# Patient Record
Sex: FEMALE | Race: WHITE | NOT HISPANIC OR LATINO | Employment: OTHER | URBAN - METROPOLITAN AREA
[De-identification: names, ages, dates, MRNs, and addresses within clinical notes are randomized per-mention and may not be internally consistent; named-entity substitution may affect disease eponyms.]

---

## 2018-02-27 ENCOUNTER — CATARACT CONSULT (OUTPATIENT)
Dept: URBAN - METROPOLITAN AREA CLINIC 43 | Facility: CLINIC | Age: 68
End: 2018-02-27

## 2018-02-27 VITALS
HEART RATE: 68 BPM | SYSTOLIC BLOOD PRESSURE: 138 MMHG | DIASTOLIC BLOOD PRESSURE: 80 MMHG | RESPIRATION RATE: 14 BRPM | HEIGHT: 55 IN

## 2018-02-27 DIAGNOSIS — H04.123: ICD-10-CM

## 2018-02-27 DIAGNOSIS — H25.811: ICD-10-CM

## 2018-02-27 DIAGNOSIS — H25.812: ICD-10-CM

## 2018-02-27 DIAGNOSIS — H43.813: ICD-10-CM

## 2018-02-27 DIAGNOSIS — H18.51: ICD-10-CM

## 2018-02-27 PROCEDURE — 4040F PNEUMOC VAC/ADMIN/RCVD: CPT

## 2018-02-27 PROCEDURE — 92136TC INTERFEROMETRY - TECHNICAL COMPONENT

## 2018-02-27 PROCEDURE — 92025-1 CORNEAL TOPOGRAPHY, INS

## 2018-02-27 PROCEDURE — 1036F TOBACCO NON-USER: CPT

## 2018-02-27 PROCEDURE — 99214 OFFICE O/P EST MOD 30 MIN: CPT

## 2018-02-27 PROCEDURE — 92015 DETERMINE REFRACTIVE STATE: CPT

## 2018-02-27 PROCEDURE — G8427 DOCREV CUR MEDS BY ELIG CLIN: HCPCS

## 2018-02-27 PROCEDURE — G8752 SYS BP LESS 140: HCPCS

## 2018-02-27 PROCEDURE — 92286 ANT SGM IMG I&R SPECLR MIC: CPT

## 2018-02-27 PROCEDURE — G8754 DIAS BP LESS 90: HCPCS

## 2018-02-27 RX ORDER — DUREZOL 0.5 MG/ML: 1 EMULSION OPHTHALMIC TWICE A DAY

## 2018-02-27 RX ORDER — MOXIFLOXACIN HYDROCHLORIDE 5 MG/ML: 1 SOLUTION/ DROPS OPHTHALMIC

## 2018-02-27 RX ORDER — NEPAFENAC 3 MG/ML: 1 SUSPENSION/ DROPS OPHTHALMIC ONCE A DAY

## 2018-02-27 ASSESSMENT — VISUAL ACUITY
OS_SC: 20/30-1
OD_CC: J3
OS_CC: 20/50
OD_SC: 20/30-1
OS_CC: J4
OD_BAT: <20/400
OS_SC: J12
OD_CC: 20/25-2
OS_BAT: <20/400
OD_PAM: 20/20
OD_SC: J12

## 2018-02-27 ASSESSMENT — TONOMETRY
OD_IOP_MMHG: 15
OS_IOP_MMHG: 14

## 2018-03-26 ENCOUNTER — SURGERY/PROCEDURE (OUTPATIENT)
Dept: URBAN - METROPOLITAN AREA CLINIC 43 | Facility: CLINIC | Age: 68
End: 2018-03-26

## 2018-03-26 DIAGNOSIS — H25.812: ICD-10-CM

## 2018-03-26 PROCEDURE — 66984 XCAPSL CTRC RMVL W/O ECP: CPT

## 2018-03-27 ENCOUNTER — POST OP/EVAL OF SECOND EYE (OUTPATIENT)
Dept: URBAN - METROPOLITAN AREA CLINIC 43 | Facility: CLINIC | Age: 68
End: 2018-03-27

## 2018-03-27 DIAGNOSIS — H25.811: ICD-10-CM

## 2018-03-27 PROCEDURE — G8756 NO BP MEASURE DOC: HCPCS

## 2018-03-27 PROCEDURE — 92012 INTRM OPH EXAM EST PATIENT: CPT

## 2018-03-27 PROCEDURE — G8427 DOCREV CUR MEDS BY ELIG CLIN: HCPCS

## 2018-03-27 PROCEDURE — 1036F TOBACCO NON-USER: CPT

## 2018-03-27 ASSESSMENT — TONOMETRY
OS_IOP_MMHG: 10
OD_IOP_MMHG: 12

## 2018-03-27 ASSESSMENT — VISUAL ACUITY
OS_SC: 20/30+2
OD_SC: J8
OD_BAT: >20/400
OS_SC: J6
OD_SC: 20/30

## 2018-04-02 ENCOUNTER — 1 WEEK POST-OP (OUTPATIENT)
Dept: URBAN - METROPOLITAN AREA CLINIC 39 | Facility: CLINIC | Age: 68
End: 2018-04-02

## 2018-04-02 ENCOUNTER — SURGERY/PROCEDURE (OUTPATIENT)
Dept: URBAN - METROPOLITAN AREA CLINIC 43 | Facility: CLINIC | Age: 68
End: 2018-04-02

## 2018-04-02 DIAGNOSIS — H25.811: ICD-10-CM

## 2018-04-02 DIAGNOSIS — Z96.1: ICD-10-CM

## 2018-04-02 PROCEDURE — G8756 NO BP MEASURE DOC: HCPCS

## 2018-04-02 PROCEDURE — G9744 PT NOT ELI D/T ACT DIG HTN: HCPCS

## 2018-04-02 PROCEDURE — 4040F PNEUMOC VAC/ADMIN/RCVD: CPT

## 2018-04-02 PROCEDURE — 1036F TOBACCO NON-USER: CPT

## 2018-04-02 PROCEDURE — 66984 XCAPSL CTRC RMVL W/O ECP: CPT

## 2018-04-02 PROCEDURE — 99211T TECH SERVICE

## 2018-04-02 PROCEDURE — G8428 CUR MEDS NOT DOCUMENT: HCPCS

## 2018-04-02 ASSESSMENT — VISUAL ACUITY
OS_SC: J6
OD_SC: 20/30
OS_SC: 20/25+2
OD_BAT: <20/400
OD_SC: J8

## 2018-04-02 ASSESSMENT — TONOMETRY
OS_IOP_MMHG: 14
OD_IOP_MMHG: 14

## 2018-04-03 ENCOUNTER — CATARACT POST-OP 1-DAY (OUTPATIENT)
Dept: URBAN - METROPOLITAN AREA CLINIC 43 | Facility: CLINIC | Age: 68
End: 2018-04-03

## 2018-04-03 DIAGNOSIS — Z96.1: ICD-10-CM

## 2018-04-03 PROCEDURE — 99024 POSTOP FOLLOW-UP VISIT: CPT

## 2018-04-03 ASSESSMENT — VISUAL ACUITY
OS_SC: 20/30
OD_SC: 20/40+1

## 2018-04-03 ASSESSMENT — TONOMETRY
OD_IOP_MMHG: 13
OS_IOP_MMHG: 13

## 2018-04-16 ENCOUNTER — REFRACTION ONLY (OUTPATIENT)
Dept: URBAN - METROPOLITAN AREA CLINIC 43 | Facility: CLINIC | Age: 68
End: 2018-04-16

## 2018-04-16 DIAGNOSIS — Z96.1: ICD-10-CM

## 2018-04-16 PROCEDURE — 99024 POSTOP FOLLOW-UP VISIT: CPT

## 2018-04-16 ASSESSMENT — VISUAL ACUITY
OD_SC: 20/30-1
OS_SC: 20/25+1

## 2018-04-16 ASSESSMENT — TONOMETRY
OD_IOP_MMHG: 21
OS_IOP_MMHG: 11

## 2021-11-18 NOTE — PATIENT DISCUSSION
Patient given Rx for glasses - will sched I&R training for CL - then will possibly do another fitting and reorder.

## 2022-03-22 ENCOUNTER — NEW PATIENT (OUTPATIENT)
Dept: URBAN - METROPOLITAN AREA CLINIC 43 | Facility: CLINIC | Age: 72
End: 2022-03-22

## 2022-03-22 DIAGNOSIS — H18.513: ICD-10-CM

## 2022-03-22 DIAGNOSIS — H04.123: ICD-10-CM

## 2022-03-22 DIAGNOSIS — H26.493: ICD-10-CM

## 2022-03-22 DIAGNOSIS — Z96.1: ICD-10-CM

## 2022-03-22 DIAGNOSIS — H43.813: ICD-10-CM

## 2022-03-22 PROCEDURE — 92004 COMPRE OPH EXAM NEW PT 1/>: CPT

## 2022-03-22 PROCEDURE — 92015 DETERMINE REFRACTIVE STATE: CPT

## 2022-03-22 ASSESSMENT — VISUAL ACUITY
OS_SC: 20/25+2
OD_SC: 20/25
OD_SC: J12
OD_CC: J1
OS_CC: J1
OS_SC: J8-

## 2022-03-22 ASSESSMENT — TONOMETRY
OD_IOP_MMHG: 14
OS_IOP_MMHG: 12

## 2023-04-10 ENCOUNTER — COMPREHENSIVE EXAM (OUTPATIENT)
Dept: URBAN - METROPOLITAN AREA CLINIC 43 | Facility: CLINIC | Age: 73
End: 2023-04-10

## 2023-04-10 DIAGNOSIS — H04.123: ICD-10-CM

## 2023-04-10 DIAGNOSIS — H43.813: ICD-10-CM

## 2023-04-10 DIAGNOSIS — H18.513: ICD-10-CM

## 2023-04-10 DIAGNOSIS — H26.493: ICD-10-CM

## 2023-04-10 DIAGNOSIS — Z96.1: ICD-10-CM

## 2023-04-10 PROCEDURE — 92014 COMPRE OPH EXAM EST PT 1/>: CPT

## 2023-04-10 PROCEDURE — 92015 DETERMINE REFRACTIVE STATE: CPT

## 2023-04-10 ASSESSMENT — TONOMETRY
OD_IOP_MMHG: 14
OS_IOP_MMHG: 13

## 2023-04-10 ASSESSMENT — VISUAL ACUITY
OD_SC: 20/25+2
OS_SC: 20/30+1
OD_SC: J12
OD_CC: J1
OS_SC: J6-
OS_CC: J1

## 2024-05-13 ENCOUNTER — COMPREHENSIVE EXAM (OUTPATIENT)
Dept: URBAN - METROPOLITAN AREA CLINIC 43 | Facility: CLINIC | Age: 74
End: 2024-05-13

## 2024-05-13 DIAGNOSIS — H04.123: ICD-10-CM

## 2024-05-13 DIAGNOSIS — H18.513: ICD-10-CM

## 2024-05-13 DIAGNOSIS — H26.493: ICD-10-CM

## 2024-05-13 DIAGNOSIS — H43.813: ICD-10-CM

## 2024-05-13 DIAGNOSIS — Z96.1: ICD-10-CM

## 2024-05-13 PROCEDURE — 92015 DETERMINE REFRACTIVE STATE: CPT

## 2024-05-13 PROCEDURE — 92014 COMPRE OPH EXAM EST PT 1/>: CPT

## 2024-05-13 ASSESSMENT — VISUAL ACUITY
OS_CC: J1
OS_SC: 20/20-1
OD_CC: J2 STRAINING
OD_SC: 20/20
OS_SC: J6
OD_SC: J10

## 2024-05-13 ASSESSMENT — TONOMETRY
OD_IOP_MMHG: 11
OS_IOP_MMHG: 10